# Patient Record
Sex: FEMALE | Race: WHITE | NOT HISPANIC OR LATINO | URBAN - METROPOLITAN AREA
[De-identification: names, ages, dates, MRNs, and addresses within clinical notes are randomized per-mention and may not be internally consistent; named-entity substitution may affect disease eponyms.]

---

## 2018-03-30 ENCOUNTER — IMPORTED ENCOUNTER (OUTPATIENT)
Dept: URBAN - METROPOLITAN AREA CLINIC 50 | Facility: CLINIC | Age: 58
End: 2018-03-30

## 2021-04-17 ASSESSMENT — VISUAL ACUITY
OD_SC: 20/20
OS_CC: J1+-2
OD_CC: J1+-2
OS_SC: 20/20-3

## 2021-04-17 ASSESSMENT — TONOMETRY
OS_IOP_MMHG: 18
OD_IOP_MMHG: 18

## 2023-11-14 NOTE — PATIENT DISCUSSION
"""Discussed ocular health
"""Informed patient that their cataract(s) are not visually significant or do not meet the criteria for cataract surgery.  Recommended attention to common cataract symptoms
weight-bearing as tolerated